# Patient Record
Sex: FEMALE
[De-identification: names, ages, dates, MRNs, and addresses within clinical notes are randomized per-mention and may not be internally consistent; named-entity substitution may affect disease eponyms.]

---

## 2024-06-25 ENCOUNTER — TRANSCRIPTION ENCOUNTER (OUTPATIENT)
Age: 29
End: 2024-06-25

## 2024-06-25 ENCOUNTER — APPOINTMENT (OUTPATIENT)
Dept: OBGYN | Facility: CLINIC | Age: 29
End: 2024-06-25
Payer: SELF-PAY

## 2024-06-25 VITALS — SYSTOLIC BLOOD PRESSURE: 114 MMHG | DIASTOLIC BLOOD PRESSURE: 76 MMHG | WEIGHT: 190 LBS

## 2024-06-25 PROBLEM — Z00.00 ENCOUNTER FOR PREVENTIVE HEALTH EXAMINATION: Status: ACTIVE | Noted: 2024-06-25

## 2024-06-25 PROCEDURE — 0500F INITIAL PRENATAL CARE VISIT: CPT

## 2024-06-28 ENCOUNTER — APPOINTMENT (OUTPATIENT)
Dept: OBGYN | Facility: CLINIC | Age: 29
End: 2024-06-28
Payer: COMMERCIAL

## 2024-06-28 ENCOUNTER — ASOB RESULT (OUTPATIENT)
Age: 29
End: 2024-06-28

## 2024-06-28 ENCOUNTER — MED ADMIN CHARGE (OUTPATIENT)
Age: 29
End: 2024-06-28

## 2024-06-28 VITALS
BODY MASS INDEX: 34.78 KG/M2 | DIASTOLIC BLOOD PRESSURE: 72 MMHG | SYSTOLIC BLOOD PRESSURE: 112 MMHG | WEIGHT: 189 LBS | HEIGHT: 62 IN

## 2024-06-28 DIAGNOSIS — Z3A.31 31 WEEKS GESTATION OF PREGNANCY: ICD-10-CM

## 2024-06-28 DIAGNOSIS — Z23 ENCOUNTER FOR IMMUNIZATION: ICD-10-CM

## 2024-06-28 PROCEDURE — 0502F SUBSEQUENT PRENATAL CARE: CPT

## 2024-06-28 PROCEDURE — 76819 FETAL BIOPHYS PROFIL W/O NST: CPT | Mod: 59

## 2024-06-28 PROCEDURE — 76816 OB US FOLLOW-UP PER FETUS: CPT

## 2024-07-02 ENCOUNTER — APPOINTMENT (OUTPATIENT)
Dept: OBGYN | Facility: CLINIC | Age: 29
End: 2024-07-02
Payer: COMMERCIAL

## 2024-07-02 VITALS — WEIGHT: 192 LBS | BODY MASS INDEX: 35.12 KG/M2 | SYSTOLIC BLOOD PRESSURE: 104 MMHG | DIASTOLIC BLOOD PRESSURE: 71 MMHG

## 2024-07-02 DIAGNOSIS — Z3A.32 32 WEEKS GESTATION OF PREGNANCY: ICD-10-CM

## 2024-07-02 PROCEDURE — 0502F SUBSEQUENT PRENATAL CARE: CPT

## 2024-07-16 ENCOUNTER — NON-APPOINTMENT (OUTPATIENT)
Age: 29
End: 2024-07-16

## 2024-07-18 ENCOUNTER — NON-APPOINTMENT (OUTPATIENT)
Age: 29
End: 2024-07-18

## 2024-07-19 ENCOUNTER — APPOINTMENT (OUTPATIENT)
Dept: OBGYN | Facility: CLINIC | Age: 29
End: 2024-07-19
Payer: COMMERCIAL

## 2024-07-19 ENCOUNTER — NON-APPOINTMENT (OUTPATIENT)
Age: 29
End: 2024-07-19

## 2024-07-19 VITALS
BODY MASS INDEX: 34.96 KG/M2 | SYSTOLIC BLOOD PRESSURE: 103 MMHG | WEIGHT: 190 LBS | DIASTOLIC BLOOD PRESSURE: 69 MMHG | HEIGHT: 62 IN

## 2024-07-19 PROCEDURE — 76816 OB US FOLLOW-UP PER FETUS: CPT

## 2024-07-19 PROCEDURE — 0502F SUBSEQUENT PRENATAL CARE: CPT

## 2024-07-19 PROCEDURE — 76819 FETAL BIOPHYS PROFIL W/O NST: CPT | Mod: 59

## 2024-08-02 ENCOUNTER — APPOINTMENT (OUTPATIENT)
Dept: OBGYN | Facility: CLINIC | Age: 29
End: 2024-08-02
Payer: COMMERCIAL

## 2024-08-02 ENCOUNTER — NON-APPOINTMENT (OUTPATIENT)
Age: 29
End: 2024-08-02

## 2024-08-02 VITALS — DIASTOLIC BLOOD PRESSURE: 74 MMHG | SYSTOLIC BLOOD PRESSURE: 114 MMHG | WEIGHT: 192 LBS | BODY MASS INDEX: 35.12 KG/M2

## 2024-08-02 DIAGNOSIS — Z3A.36 36 WEEKS GESTATION OF PREGNANCY: ICD-10-CM

## 2024-08-02 PROCEDURE — 76819 FETAL BIOPHYS PROFIL W/O NST: CPT | Mod: 59

## 2024-08-02 PROCEDURE — 76816 OB US FOLLOW-UP PER FETUS: CPT

## 2024-08-02 PROCEDURE — 0502F SUBSEQUENT PRENATAL CARE: CPT

## 2024-08-05 ENCOUNTER — NON-APPOINTMENT (OUTPATIENT)
Age: 29
End: 2024-08-05

## 2024-08-06 ENCOUNTER — APPOINTMENT (OUTPATIENT)
Dept: CARDIOLOGY | Facility: CLINIC | Age: 29
End: 2024-08-06

## 2024-08-06 PROCEDURE — 99204 OFFICE O/P NEW MOD 45 MIN: CPT

## 2024-08-06 NOTE — HISTORY OF PRESENT ILLNESS
[FreeTextEntry1] : Patient is a 29 year old woman with no PMH presented to establish care in setting of very rapid HRs noted towards the end of her pregnancy - now almost 37 weeks - with associated dizziness and DAVE  She feels the rapid HR even when she sits down to rest

## 2024-08-06 NOTE — DISCUSSION/SUMMARY
[FreeTextEntry1] : Patient is a 29 year old woman with no PMH presented to establish care in setting of very rapid HRs noted towards the end of her pregnancy - now almost 37 weeks - with associated dizziness and DAVE  She feels the rapid HR even when she sits down to rest\  palpitations - will place a cardiopatch to assess for arrhythmia; will refer for blood work to assess thyroid function and to also rule out anemia or other metabolic causes of palpations; - recently dx with anemia and started on iron supplementation a months ago - hydration was encouraged  - encouraged to avoid any stimulants - coffee, caffeine drinks, teas, etc - will also refer for exercise stress test to assess for exercise induced arrhythmias 3-6 months post delivery - will refer for an echocardiogram to assess LV function and rule out valvular pathology

## 2024-08-06 NOTE — REASON FOR VISIT
[Home] : at home, [unfilled] , at the time of the visit. [Medical Office: ([facility])___] : at the medical office located in  [Patient] : the patient

## 2024-08-07 LAB
T4 SERPL-MCNC: 9.1 UG/DL
TSH SERPL-ACNC: 1.7 UIU/ML

## 2024-08-08 ENCOUNTER — NON-APPOINTMENT (OUTPATIENT)
Age: 29
End: 2024-08-08

## 2024-08-08 LAB — B-HEM STREP SPEC QL CULT: NORMAL

## 2024-08-09 ENCOUNTER — APPOINTMENT (OUTPATIENT)
Dept: OBGYN | Facility: CLINIC | Age: 29
End: 2024-08-09

## 2024-08-09 PROCEDURE — 0502F SUBSEQUENT PRENATAL CARE: CPT

## 2024-08-14 ENCOUNTER — NON-APPOINTMENT (OUTPATIENT)
Age: 29
End: 2024-08-14

## 2024-08-15 ENCOUNTER — APPOINTMENT (OUTPATIENT)
Dept: OBGYN | Facility: CLINIC | Age: 29
End: 2024-08-15
Payer: COMMERCIAL

## 2024-08-15 ENCOUNTER — ASOB RESULT (OUTPATIENT)
Age: 29
End: 2024-08-15

## 2024-08-15 VITALS
SYSTOLIC BLOOD PRESSURE: 110 MMHG | WEIGHT: 197 LBS | HEIGHT: 62 IN | DIASTOLIC BLOOD PRESSURE: 73 MMHG | BODY MASS INDEX: 36.25 KG/M2

## 2024-08-15 DIAGNOSIS — Z3A.38 38 WEEKS GESTATION OF PREGNANCY: ICD-10-CM

## 2024-08-15 DIAGNOSIS — Z34.93 ENCOUNTER FOR SUPERVISION OF NORMAL PREGNANCY, UNSPECIFIED, THIRD TRIMESTER: ICD-10-CM

## 2024-08-15 PROCEDURE — 76819 FETAL BIOPHYS PROFIL W/O NST: CPT

## 2024-08-15 PROCEDURE — 76816 OB US FOLLOW-UP PER FETUS: CPT | Mod: 59

## 2024-08-15 PROCEDURE — 0502F SUBSEQUENT PRENATAL CARE: CPT

## 2024-08-23 ENCOUNTER — APPOINTMENT (OUTPATIENT)
Dept: OBGYN | Facility: CLINIC | Age: 29
End: 2024-08-23

## 2024-09-26 ENCOUNTER — NON-APPOINTMENT (OUTPATIENT)
Age: 29
End: 2024-09-26

## 2024-09-27 ENCOUNTER — APPOINTMENT (OUTPATIENT)
Dept: OBGYN | Facility: CLINIC | Age: 29
End: 2024-09-27
Payer: COMMERCIAL

## 2024-09-27 PROCEDURE — G0444 DEPRESSION SCREEN ANNUAL: CPT

## 2024-09-27 PROCEDURE — 0503F POSTPARTUM CARE VISIT: CPT

## 2024-10-01 ENCOUNTER — NON-APPOINTMENT (OUTPATIENT)
Age: 29
End: 2024-10-01

## 2024-10-05 LAB — CYTOLOGY CVX/VAG DOC THIN PREP: NORMAL

## 2024-10-12 ENCOUNTER — EMERGENCY (EMERGENCY)
Facility: HOSPITAL | Age: 29
LOS: 1 days | Discharge: HOME | End: 2024-10-12
Admitting: EMERGENCY MEDICINE
Payer: COMMERCIAL

## 2024-10-12 VITALS
TEMPERATURE: 98 F | OXYGEN SATURATION: 97 % | WEIGHT: 190.04 LBS | SYSTOLIC BLOOD PRESSURE: 121 MMHG | RESPIRATION RATE: 18 BRPM | HEART RATE: 98 BPM | DIASTOLIC BLOOD PRESSURE: 85 MMHG

## 2024-10-12 RX ORDER — SODIUM CHLORIDE 0.9 % (FLUSH) 0.9 %
1000 SYRINGE (ML) INJECTION ONCE
Refills: 0 | Status: COMPLETED | OUTPATIENT
Start: 2024-10-12 | End: 2024-10-12

## 2024-10-12 RX ORDER — ACETAMINOPHEN 325 MG
1000 TABLET ORAL ONCE
Refills: 0 | Status: COMPLETED | OUTPATIENT
Start: 2024-10-12 | End: 2024-10-12

## 2024-10-12 NOTE — ED ADULT TRIAGE NOTE - SOURCE OF INFORMATION
Patient Detail Level: Detailed General Sunscreen Counseling: I recommended a broad spectrum sunscreen with a SPF of 30 or higher.  I explained that SPF 30 sunscreens block approximately 97 percent of the sun's harmful rays.  Sunscreens should be applied at least 15 minutes prior to expected sun exposure and then every 2 hours after that as long as sun exposure continues. If swimming or exercising sunscreen should be reapplied every 45 minutes to an hour after getting wet or sweating.  One ounce, or the equivalent of a shot glass full of sunscreen, is adequate to protect the skin not covered by a bathing suit. I also recommended a lip balm with a sunscreen as well. Sun protective clothing can be used in lieu of sunscreen but must be worn the entire time you are exposed to the sun's rays.

## 2024-10-12 NOTE — ED ADULT TRIAGE NOTE - CHIEF COMPLAINT QUOTE
Pt c/o infection to R thigh, states she had a pimple that she popped 1 day ago, states site is red and warm. Prescribed abx by PCP, unsure which. Denies fevers/chills. No hx. Well appearing

## 2024-10-13 VITALS
OXYGEN SATURATION: 100 % | DIASTOLIC BLOOD PRESSURE: 76 MMHG | RESPIRATION RATE: 16 BRPM | HEART RATE: 89 BPM | TEMPERATURE: 98 F | SYSTOLIC BLOOD PRESSURE: 112 MMHG

## 2024-10-13 LAB
ALBUMIN SERPL ELPH-MCNC: 3.9 G/DL — SIGNIFICANT CHANGE UP (ref 3.3–5)
ALP SERPL-CCNC: 159 U/L — HIGH (ref 40–120)
ALT FLD-CCNC: 16 U/L — SIGNIFICANT CHANGE UP (ref 4–33)
ANION GAP SERPL CALC-SCNC: 14 MMOL/L — SIGNIFICANT CHANGE UP (ref 7–14)
APPEARANCE UR: CLEAR — SIGNIFICANT CHANGE UP
AST SERPL-CCNC: 18 U/L — SIGNIFICANT CHANGE UP (ref 4–32)
BASOPHILS # BLD AUTO: 0.05 K/UL — SIGNIFICANT CHANGE UP (ref 0–0.2)
BASOPHILS NFR BLD AUTO: 0.5 % — SIGNIFICANT CHANGE UP (ref 0–2)
BILIRUB SERPL-MCNC: <0.2 MG/DL — SIGNIFICANT CHANGE UP (ref 0.2–1.2)
BILIRUB UR-MCNC: NEGATIVE — SIGNIFICANT CHANGE UP
BLD GP AB SCN SERPL QL: NEGATIVE — SIGNIFICANT CHANGE UP
BLOOD GAS VENOUS COMPREHENSIVE RESULT: SIGNIFICANT CHANGE UP
BUN SERPL-MCNC: 22 MG/DL — SIGNIFICANT CHANGE UP (ref 7–23)
CALCIUM SERPL-MCNC: 8.9 MG/DL — SIGNIFICANT CHANGE UP (ref 8.4–10.5)
CHLORIDE SERPL-SCNC: 104 MMOL/L — SIGNIFICANT CHANGE UP (ref 98–107)
CO2 SERPL-SCNC: 23 MMOL/L — SIGNIFICANT CHANGE UP (ref 22–31)
COLOR SPEC: YELLOW — SIGNIFICANT CHANGE UP
CREAT SERPL-MCNC: 0.81 MG/DL — SIGNIFICANT CHANGE UP (ref 0.5–1.3)
DIFF PNL FLD: NEGATIVE — SIGNIFICANT CHANGE UP
EGFR: 101 ML/MIN/1.73M2 — SIGNIFICANT CHANGE UP
EOSINOPHIL # BLD AUTO: 0.23 K/UL — SIGNIFICANT CHANGE UP (ref 0–0.5)
EOSINOPHIL NFR BLD AUTO: 2.1 % — SIGNIFICANT CHANGE UP (ref 0–6)
GLUCOSE SERPL-MCNC: 116 MG/DL — HIGH (ref 70–99)
GLUCOSE UR QL: NEGATIVE MG/DL — SIGNIFICANT CHANGE UP
HCT VFR BLD CALC: 34.8 % — SIGNIFICANT CHANGE UP (ref 34.5–45)
HGB BLD-MCNC: 11.6 G/DL — SIGNIFICANT CHANGE UP (ref 11.5–15.5)
IANC: 6.66 K/UL — SIGNIFICANT CHANGE UP (ref 1.8–7.4)
IMM GRANULOCYTES NFR BLD AUTO: 0.3 % — SIGNIFICANT CHANGE UP (ref 0–0.9)
KETONES UR-MCNC: ABNORMAL MG/DL
LEUKOCYTE ESTERASE UR-ACNC: NEGATIVE — SIGNIFICANT CHANGE UP
LIDOCAIN IGE QN: 47 U/L — SIGNIFICANT CHANGE UP (ref 7–60)
LYMPHOCYTES # BLD AUTO: 29.4 % — SIGNIFICANT CHANGE UP (ref 13–44)
LYMPHOCYTES # BLD AUTO: 3.25 K/UL — SIGNIFICANT CHANGE UP (ref 1–3.3)
MCHC RBC-ENTMCNC: 30.5 PG — SIGNIFICANT CHANGE UP (ref 27–34)
MCHC RBC-ENTMCNC: 33.3 GM/DL — SIGNIFICANT CHANGE UP (ref 32–36)
MCV RBC AUTO: 91.6 FL — SIGNIFICANT CHANGE UP (ref 80–100)
MONOCYTES # BLD AUTO: 0.83 K/UL — SIGNIFICANT CHANGE UP (ref 0–0.9)
MONOCYTES NFR BLD AUTO: 7.5 % — SIGNIFICANT CHANGE UP (ref 2–14)
NEUTROPHILS # BLD AUTO: 6.66 K/UL — SIGNIFICANT CHANGE UP (ref 1.8–7.4)
NEUTROPHILS NFR BLD AUTO: 60.2 % — SIGNIFICANT CHANGE UP (ref 43–77)
NITRITE UR-MCNC: NEGATIVE — SIGNIFICANT CHANGE UP
NRBC # BLD: 0 /100 WBCS — SIGNIFICANT CHANGE UP (ref 0–0)
NRBC # FLD: 0 K/UL — SIGNIFICANT CHANGE UP (ref 0–0)
PH UR: 6 — SIGNIFICANT CHANGE UP (ref 5–8)
PLATELET # BLD AUTO: 331 K/UL — SIGNIFICANT CHANGE UP (ref 150–400)
POTASSIUM SERPL-MCNC: 3.9 MMOL/L — SIGNIFICANT CHANGE UP (ref 3.5–5.3)
POTASSIUM SERPL-SCNC: 3.9 MMOL/L — SIGNIFICANT CHANGE UP (ref 3.5–5.3)
PROT SERPL-MCNC: 7.4 G/DL — SIGNIFICANT CHANGE UP (ref 6–8.3)
PROT UR-MCNC: SIGNIFICANT CHANGE UP MG/DL
RBC # BLD: 3.8 M/UL — SIGNIFICANT CHANGE UP (ref 3.8–5.2)
RBC # FLD: 14.4 % — SIGNIFICANT CHANGE UP (ref 10.3–14.5)
RH IG SCN BLD-IMP: POSITIVE — SIGNIFICANT CHANGE UP
SODIUM SERPL-SCNC: 141 MMOL/L — SIGNIFICANT CHANGE UP (ref 135–145)
SP GR SPEC: 1.04 — HIGH (ref 1–1.03)
UROBILINOGEN FLD QL: 1 MG/DL — SIGNIFICANT CHANGE UP (ref 0.2–1)
WBC # BLD: 11.05 K/UL — HIGH (ref 3.8–10.5)
WBC # FLD AUTO: 11.05 K/UL — HIGH (ref 3.8–10.5)

## 2024-10-13 RX ORDER — CEPHALEXIN 500 MG
500 CAPSULE ORAL EVERY 6 HOURS
Refills: 0 | Status: DISCONTINUED | OUTPATIENT
Start: 2024-10-13 | End: 2024-10-16

## 2024-10-13 RX ORDER — CEPHALEXIN 500 MG
1 CAPSULE ORAL
Qty: 40 | Refills: 0
Start: 2024-10-13 | End: 2024-10-22

## 2024-10-13 RX ADMIN — Medication 1000 MILLIGRAM(S): at 00:33

## 2024-10-13 RX ADMIN — Medication 400 MILLIGRAM(S): at 00:03

## 2024-10-13 RX ADMIN — Medication 1000 MILLILITER(S): at 00:04

## 2024-10-13 RX ADMIN — Medication 500 MILLIGRAM(S): at 03:31

## 2024-10-13 NOTE — ED PROVIDER NOTE - OBJECTIVE STATEMENT
29-year-old female with no known past medical history presents to the ED complaining of 2 days of pain and redness to her inner thigh.  Patient states she had a "pimple" there which she squeezed with some purulent discharge, however there is still some surrounding redness and inflammation.  She went to an urgent care and was prescribed a "cream" which she has been using, however feels like the redness is extending beyond that demarcated line.  She also complains of right lower quadrant abdominal pain that also started this afternoon, with no associated nausea, vomiting, diarrhea, constipation, melena, hematochezia.  Currently breast-feeding her 2-month-old.  Denies any other symptoms.

## 2024-10-13 NOTE — ED PROVIDER NOTE - PATIENT PORTAL LINK FT
You can access the FollowMyHealth Patient Portal offered by [facility] by registering at the following website: [email or web address]. By joining [facility] FollowMyHealth portal, you will also be able to view your health information using other applications (apps) compatible with our system.

## 2024-10-13 NOTE — ED PROVIDER NOTE - ABDOMINAL EXAM
soft/tender... Chaperone ED Tech Yani, perineum intact, right inner thigh  circular area of erythema, no crepitus/soft/tender...

## 2024-10-13 NOTE — ED ADULT NURSE NOTE - MODE OF DISCHARGE
Patient had milk at 0430. Per Dr Floyd must wait 8 hours before anesthesia. Per Dr Cunnignham patient is offered straight local and patient accepts this.   
normal...
Ambulatory

## 2024-10-13 NOTE — ED PROVIDER NOTE - PROGRESS NOTE DETAILS
LEORA Noble Addendum-----This patient was signed out to me by LEORA Easton; pt was pending CT scan abd/pelvis at time of sign-out.  Per discussion with LEORA Easton, if CT with no emergent findings, pt can be dc'd home with Rx for cephalexin for thigh cellulitis noted on exam.  In the interim, pt objectively noted to be resting comfortably; pt has been clinically stable; no issues thus far.  CT abd/pelvis was performed; per official radiology report: "...IMPRESSION: Normal appendix. No acute appendicitis. Suggestion of constipation. Focal fat stranding seen along the right upper medial thigh, likely related to provided history of cellulitis.".  I discussed all lab/imaging results with pt including all incidental findings, and CT radiology report was reviewed IN FULL, line by line, with all findings explained in detail.  I discussed with pt dietary instructions for constipation, as well as (Miralax/Fleet Enema AL) once daily x 2 - 3 days if needed; pt verbalized understanding.  Strict return precautions discussed.  Pt medically stable for dc home; pt will be dc'd per below instructions.

## 2024-10-13 NOTE — ED ADULT NURSE NOTE - OBJECTIVE STATEMENT
29 y female with no past medical history c/o right  thigh redness and pain. circular redness noted, the are is warm to tough. Pt stated that she developed it 2 days ago. Pt also reports right lower quadrant abdominal pain x1 day. Pt denies nausea, dysuria. 20 g IV placed right AC. blood obtained and sent to the lab

## 2024-10-13 NOTE — ED PROVIDER NOTE - NSFOLLOWUPINSTRUCTIONS_ED_ALL_ED_FT
Follow up with your primary care medical doctor within 2-3 days of ER discharge.  **Bring your discharge papers / test results with you to your follow up appointment.  Please review all test results performed in the Emergency Department with your doctor.      If you ever need assistance finding a doctor to follow up with, you may call the [facility] Patient Access Services helpline at [phone] to find names/contact #s for a provider/specialist to follow up with.    Rest / no strenuous activity.  Stay well hydrated.    A copy of your test results is being provided to you.  All results including incidental findings were reviewed at discharge.  Should you have any questions that arise after you are discharged, please feel free to contact the ER ([phone]) to have your questions answered.    MEDICATIONS:  See attached medication sheet for details of prescriptions sent to your pharmacy.  These medication prescription details were reviewed with you; please make sure to take all medications AS PRESCRIBED.        ***Return to the Emergency Department IMMEDIATELY if you experience any new / worsening symptoms or have any problems / concerns.***

## 2024-10-13 NOTE — ED PROVIDER NOTE - CLINICAL SUMMARY MEDICAL DECISION MAKING FREE TEXT BOX
29-year-old female with no known past medical history presents to the ED complaining of 2 days of pain and redness to her inner right eye.  Patient states she had a pimple there which she squeezed with some purulent discharge, however there is still some surrounding redness and inflammation.  She went to an urgent care and was prescribed a "cream" which she has been using, however feels like the redness is extending beyond that demarcated line.  She also complains of right lower quadrant abdominal pain that also started this afternoon, with no associated nausea, vomiting, diarrhea, constipation, melena, hematochezia.  Currently breast-feeding her 2-month-old.  Denies any other symptoms.  On exam, patient is well-appearing, nonseptic, in no acute distress.  Abdomen soft with right lower quadrant tenderness, no peritoneal signs. Imp:  Right inner thigh there is a circular area of erythema and inflammation, does not go beyond the demarcated line significantly. suspect cellulitis without abscess at this time, right lower quadrant tenderness unlikely related to cellulitis, concerning for rule out acute appendicitis.  For labs, CT abdomen pelvis, analgesics, reassess.

## 2024-10-13 NOTE — ED PROVIDER NOTE - CARE PLAN
1 Principal Discharge DX:	Abdominal pain   Principal Discharge DX:	Abdominal pain  Secondary Diagnosis:	Cellulitis